# Patient Record
Sex: FEMALE | HISPANIC OR LATINO | URBAN - METROPOLITAN AREA
[De-identification: names, ages, dates, MRNs, and addresses within clinical notes are randomized per-mention and may not be internally consistent; named-entity substitution may affect disease eponyms.]

---

## 2023-02-20 ENCOUNTER — OFFICE (OUTPATIENT)
Dept: URBAN - METROPOLITAN AREA CLINIC 116 | Facility: CLINIC | Age: 58
Setting detail: OPHTHALMOLOGY
End: 2023-02-20
Payer: COMMERCIAL

## 2023-02-20 DIAGNOSIS — H40.033: ICD-10-CM

## 2023-02-20 DIAGNOSIS — H25.13: ICD-10-CM

## 2023-02-20 DIAGNOSIS — H11.041: ICD-10-CM

## 2023-02-20 PROCEDURE — 92004 COMPRE OPH EXAM NEW PT 1/>: CPT | Performed by: OPTOMETRIST

## 2023-02-20 ASSESSMENT — REFRACTION_AUTOREFRACTION
OD_AXIS: 170
OS_SPHERE: +4.75
OS_AXIS: 115
OS_CYLINDER: -0.75
OD_CYLINDER: -1.00
OD_SPHERE: +4.50

## 2023-02-20 ASSESSMENT — SPHEQUIV_DERIVED
OS_SPHEQUIV: 4.375
OD_SPHEQUIV: 3.875
OD_SPHEQUIV: 4
OS_SPHEQUIV: 4.25
OS_SPHEQUIV: 2.5

## 2023-02-20 ASSESSMENT — REFRACTION_MANIFEST
OD_VA2: 20/20
OS_SPHERE: +3.25
OD_SPHERE: +4.25
OS_CYLINDER: SHPERE
OD_VA1: 20/25
OD_AXIS: 170
OD_VA1: 20/25
OS_VA1: 20/25
OD_ADD: +2.25
OS_ADD: +175
OD_VA1: 20/20
OD_SPHERE: +2.50
OD_ADD: +1.75
OS_AXIS: 150
OS_VA2: 20/20
OS_CYLINDER: -0.50
OS_CYLINDER: -0.50
OS_ADD: +2.25
OS_VA1: 20/20
OD_SPHERE: +3.25
OD_CYLINDER: SHPERE
OD_CYLINDER: -0.75
OS_AXIS: 115
OS_SPHERE: +4.50
OS_VA1: 20/25
OS_SPHERE: +2.75

## 2023-02-20 ASSESSMENT — REFRACTION_CURRENTRX
OS_CYLINDER: -0.25
OD_CYLINDER: -0.50
OS_SPHERE: +3.75
OS_VPRISM_DIRECTION: BF
OS_ADD: +1.75
OD_SPHERE: +3.75
OD_OVR_VA: 20/
OD_AXIS: 005
OD_VPRISM_DIRECTION: BF
OS_OVR_VA: 20/
OD_ADD: +1.75
OS_AXIS: 105

## 2023-02-20 ASSESSMENT — KERATOMETRY
OS_AXISANGLE_DEGREES: 170
METHOD_AUTO_MANUAL: AUTO
OD_K2POWER_DIOPTERS: 43.75
OD_AXISANGLE_DEGREES: 085
OS_K2POWER_DIOPTERS: 43.25
OD_K1POWER_DIOPTERS: 42.50
OS_K1POWER_DIOPTERS: 39.50

## 2023-02-20 ASSESSMENT — TONOMETRY
OS_IOP_MMHG: 17
OD_IOP_MMHG: 15

## 2023-02-20 ASSESSMENT — VISUAL ACUITY
OD_BCVA: 20/25
OS_BCVA: 20/25

## 2023-02-20 ASSESSMENT — CONFRONTATIONAL VISUAL FIELD TEST (CVF)
OD_FINDINGS: FULL
OS_FINDINGS: FULL

## 2023-02-20 ASSESSMENT — AXIALLENGTH_DERIVED
OS_AL: 23.4
OD_AL: 22.2953
OS_AL: 22.7479
OS_AL: 22.7027
OD_AL: 22.2519

## 2023-02-20 ASSESSMENT — CORNEAL PTERYGIUM: OD_PTERYGIUM: NASAL 2MM

## 2023-03-10 ENCOUNTER — OFFICE (OUTPATIENT)
Dept: URBAN - METROPOLITAN AREA CLINIC 94 | Facility: CLINIC | Age: 58
Setting detail: OPHTHALMOLOGY
End: 2023-03-10
Payer: COMMERCIAL

## 2023-03-10 DIAGNOSIS — H40.033: ICD-10-CM

## 2023-03-10 DIAGNOSIS — H25.13: ICD-10-CM

## 2023-03-10 PROCEDURE — 92020 GONIOSCOPY: CPT | Performed by: OPHTHALMOLOGY

## 2023-03-10 PROCEDURE — 99214 OFFICE O/P EST MOD 30 MIN: CPT | Performed by: OPHTHALMOLOGY

## 2023-03-10 ASSESSMENT — KERATOMETRY
OS_K1POWER_DIOPTERS: 43.00
OD_AXISANGLE_DEGREES: 089
OS_CYLAXISANGLE_DEGREES: 016
OS_AXISANGLE_DEGREES: 016
OD_AXISANGLE_DEGREES: 179
METHOD_AUTO_MANUAL: AUTO
OD_K2POWER_DIOPTERS: 43.75
OS_AXISANGLE2_DEGREES: 016
OS_K1K2_AVERAGE: 43.25
OD_CYLAXISANGLE_DEGREES: 089
OD_AXISANGLE2_DEGREES: 089
OD_K1K2_AVERAGE: 43.125
OS_K1POWER_DIOPTERS: 43.00
OD_K1POWER_DIOPTERS: 42.50
OD_CYLPOWER_DEGREES: 1.25
OS_CYLPOWER_DEGREES: 0.5
OD_K1POWER_DIOPTERS: 42.50
OS_K2POWER_DIOPTERS: 43.50
OS_K2POWER_DIOPTERS: 43.50
OD_K2POWER_DIOPTERS: 43.75
OS_AXISANGLE_DEGREES: 106

## 2023-03-10 ASSESSMENT — REFRACTION_AUTOREFRACTION
OS_SPHERE: +4.75
OS_CYLINDER: -0.75
OS_AXIS: 111
OD_SPHERE: +4.50
OD_CYLINDER: -0.50
OD_AXIS: 168

## 2023-03-10 ASSESSMENT — REFRACTION_MANIFEST
OS_SPHERE: +4.50
OD_CYLINDER: SHPERE
OD_CYLINDER: -0.75
OS_VA1: 20/25
OD_VA1: 20/25
OD_ADD: +1.75
OS_CYLINDER: SHPERE
OD_SPHERE: +2.50
OD_AXIS: 170
OS_AXIS: 115
OD_VA1: 20/25
OD_ADD: +2.25
OS_ADD: +2.25
OS_CYLINDER: -0.50
OD_SPHERE: +3.25
OS_VA1: 20/25
OD_VA2: 20/20
OD_SPHERE: +4.25
OD_VA1: 20/20
OS_VA2: 20/20
OS_CYLINDER: -0.50
OS_AXIS: 150
OS_SPHERE: +2.75
OS_SPHERE: +3.25
OS_ADD: +175
OS_VA1: 20/20

## 2023-03-10 ASSESSMENT — CONFRONTATIONAL VISUAL FIELD TEST (CVF)
OD_FINDINGS: FULL
OS_FINDINGS: FULL

## 2023-03-10 ASSESSMENT — REFRACTION_CURRENTRX
OD_VPRISM_DIRECTION: BF
OS_OVR_VA: 20/
OD_SPHERE: +3.75
OS_AXIS: 105
OS_SPHERE: +3.75
OS_CYLINDER: -0.25
OD_CYLINDER: -0.50
OD_OVR_VA: 20/
OD_AXIS: 005
OD_ADD: +1.75
OS_ADD: +1.75
OS_VPRISM_DIRECTION: BF

## 2023-03-10 ASSESSMENT — AXIALLENGTH_DERIVED
OD_AL: 22.1656
OS_AL: 22.7415
OS_AL: 22.0824
OD_AL: 22.2953
OS_AL: 22.1251

## 2023-03-10 ASSESSMENT — SPHEQUIV_DERIVED
OD_SPHEQUIV: 4.25
OS_SPHEQUIV: 2.5
OS_SPHEQUIV: 4.375
OD_SPHEQUIV: 3.875
OS_SPHEQUIV: 4.25

## 2023-03-10 ASSESSMENT — CORNEAL PTERYGIUM: OD_PTERYGIUM: NASAL 2MM

## 2023-03-10 ASSESSMENT — VISUAL ACUITY
OD_BCVA: 20/25-
OS_BCVA: 20/20-

## 2023-03-10 ASSESSMENT — TONOMETRY
OD_IOP_MMHG: 16
OS_IOP_MMHG: 16

## 2023-03-18 ENCOUNTER — EMERGENCY (EMERGENCY)
Facility: HOSPITAL | Age: 58
LOS: 1 days | Discharge: HOME | End: 2023-03-18
Attending: STUDENT IN AN ORGANIZED HEALTH CARE EDUCATION/TRAINING PROGRAM
Payer: COMMERCIAL

## 2023-03-18 VITALS
TEMPERATURE: 98 F | DIASTOLIC BLOOD PRESSURE: 96 MMHG | SYSTOLIC BLOOD PRESSURE: 181 MMHG | OXYGEN SATURATION: 99 % | RESPIRATION RATE: 20 BRPM | HEART RATE: 89 BPM

## 2023-03-18 DIAGNOSIS — R11.0 NAUSEA: ICD-10-CM

## 2023-03-18 DIAGNOSIS — I10 ESSENTIAL (PRIMARY) HYPERTENSION: ICD-10-CM

## 2023-03-18 DIAGNOSIS — M79.602 PAIN IN LEFT ARM: ICD-10-CM

## 2023-03-18 LAB
ALBUMIN SERPL ELPH-MCNC: 3.9 G/DL — SIGNIFICANT CHANGE UP (ref 3.3–5.2)
ALP SERPL-CCNC: 119 U/L — SIGNIFICANT CHANGE UP (ref 40–120)
ALT FLD-CCNC: 16 U/L — SIGNIFICANT CHANGE UP
ANION GAP SERPL CALC-SCNC: 13 MMOL/L — SIGNIFICANT CHANGE UP (ref 5–17)
AST SERPL-CCNC: 21 U/L — SIGNIFICANT CHANGE UP
BASOPHILS # BLD AUTO: 0.04 K/UL — SIGNIFICANT CHANGE UP (ref 0–0.2)
BASOPHILS NFR BLD AUTO: 0.3 % — SIGNIFICANT CHANGE UP (ref 0–2)
BILIRUB SERPL-MCNC: 0.2 MG/DL — LOW (ref 0.4–2)
BUN SERPL-MCNC: 12.6 MG/DL — SIGNIFICANT CHANGE UP (ref 8–20)
CALCIUM SERPL-MCNC: 8.8 MG/DL — SIGNIFICANT CHANGE UP (ref 8.4–10.5)
CHLORIDE SERPL-SCNC: 101 MMOL/L — SIGNIFICANT CHANGE UP (ref 96–108)
CO2 SERPL-SCNC: 23 MMOL/L — SIGNIFICANT CHANGE UP (ref 22–29)
CREAT SERPL-MCNC: 0.65 MG/DL — SIGNIFICANT CHANGE UP (ref 0.5–1.3)
EGFR: 102 ML/MIN/1.73M2 — SIGNIFICANT CHANGE UP
EOSINOPHIL # BLD AUTO: 0.15 K/UL — SIGNIFICANT CHANGE UP (ref 0–0.5)
EOSINOPHIL NFR BLD AUTO: 1.1 % — SIGNIFICANT CHANGE UP (ref 0–6)
GLUCOSE SERPL-MCNC: 232 MG/DL — HIGH (ref 70–99)
HCT VFR BLD CALC: 41.4 % — SIGNIFICANT CHANGE UP (ref 34.5–45)
HGB BLD-MCNC: 13.7 G/DL — SIGNIFICANT CHANGE UP (ref 11.5–15.5)
IMM GRANULOCYTES NFR BLD AUTO: 0.4 % — SIGNIFICANT CHANGE UP (ref 0–0.9)
LYMPHOCYTES # BLD AUTO: 27 % — SIGNIFICANT CHANGE UP (ref 13–44)
LYMPHOCYTES # BLD AUTO: 3.72 K/UL — HIGH (ref 1–3.3)
MCHC RBC-ENTMCNC: 29.1 PG — SIGNIFICANT CHANGE UP (ref 27–34)
MCHC RBC-ENTMCNC: 33.1 GM/DL — SIGNIFICANT CHANGE UP (ref 32–36)
MCV RBC AUTO: 88.1 FL — SIGNIFICANT CHANGE UP (ref 80–100)
MONOCYTES # BLD AUTO: 0.74 K/UL — SIGNIFICANT CHANGE UP (ref 0–0.9)
MONOCYTES NFR BLD AUTO: 5.4 % — SIGNIFICANT CHANGE UP (ref 2–14)
NEUTROPHILS # BLD AUTO: 9.06 K/UL — HIGH (ref 1.8–7.4)
NEUTROPHILS NFR BLD AUTO: 65.8 % — SIGNIFICANT CHANGE UP (ref 43–77)
PLATELET # BLD AUTO: 222 K/UL — SIGNIFICANT CHANGE UP (ref 150–400)
POTASSIUM SERPL-MCNC: 4.3 MMOL/L — SIGNIFICANT CHANGE UP (ref 3.5–5.3)
POTASSIUM SERPL-SCNC: 4.3 MMOL/L — SIGNIFICANT CHANGE UP (ref 3.5–5.3)
PROT SERPL-MCNC: 7.2 G/DL — SIGNIFICANT CHANGE UP (ref 6.6–8.7)
RBC # BLD: 4.7 M/UL — SIGNIFICANT CHANGE UP (ref 3.8–5.2)
RBC # FLD: 13.7 % — SIGNIFICANT CHANGE UP (ref 10.3–14.5)
SODIUM SERPL-SCNC: 137 MMOL/L — SIGNIFICANT CHANGE UP (ref 135–145)
TROPONIN T SERPL-MCNC: <0.01 NG/ML — SIGNIFICANT CHANGE UP (ref 0–0.06)
WBC # BLD: 13.77 K/UL — HIGH (ref 3.8–10.5)
WBC # FLD AUTO: 13.77 K/UL — HIGH (ref 3.8–10.5)

## 2023-03-18 PROCEDURE — 93010 ELECTROCARDIOGRAM REPORT: CPT

## 2023-03-18 PROCEDURE — 99284 EMERGENCY DEPT VISIT MOD MDM: CPT

## 2023-03-18 PROCEDURE — 96374 THER/PROPH/DIAG INJ IV PUSH: CPT

## 2023-03-18 PROCEDURE — 84484 ASSAY OF TROPONIN QUANT: CPT

## 2023-03-18 PROCEDURE — 99285 EMERGENCY DEPT VISIT HI MDM: CPT

## 2023-03-18 PROCEDURE — 70450 CT HEAD/BRAIN W/O DYE: CPT | Mod: MG

## 2023-03-18 PROCEDURE — 36415 COLL VENOUS BLD VENIPUNCTURE: CPT

## 2023-03-18 PROCEDURE — 93005 ELECTROCARDIOGRAM TRACING: CPT

## 2023-03-18 PROCEDURE — 99285 EMERGENCY DEPT VISIT HI MDM: CPT | Mod: 25

## 2023-03-18 PROCEDURE — T1013: CPT

## 2023-03-18 PROCEDURE — 85025 COMPLETE CBC W/AUTO DIFF WBC: CPT

## 2023-03-18 PROCEDURE — 71045 X-RAY EXAM CHEST 1 VIEW: CPT | Mod: 26

## 2023-03-18 PROCEDURE — 71045 X-RAY EXAM CHEST 1 VIEW: CPT

## 2023-03-18 PROCEDURE — 80053 COMPREHEN METABOLIC PANEL: CPT

## 2023-03-18 PROCEDURE — G1004: CPT

## 2023-03-18 RX ORDER — KETOROLAC TROMETHAMINE 30 MG/ML
30 SYRINGE (ML) INJECTION ONCE
Refills: 0 | Status: DISCONTINUED | OUTPATIENT
Start: 2023-03-18 | End: 2023-03-18

## 2023-03-18 RX ADMIN — Medication 30 MILLIGRAM(S): at 22:45

## 2023-03-18 RX ADMIN — Medication 30 MILLIGRAM(S): at 23:41

## 2023-03-18 NOTE — ED ADULT NURSE NOTE - CHIEF COMPLAINT
I have reviewed the surgical (or preoperative) H&P that is linked to this encounter, and examined the patient. There are no significant changes    Clinical Conditions Present on Arrival:  Clinically Significant Risk Factors Present on Admission                   
The patient is a 57y Female complaining of hypertension.

## 2023-03-18 NOTE — ED PROVIDER NOTE - NS ED ROS FT
No fever/chills   No photophobia/eye pain/changes in visio,   No ear pain/sore throat/dysphagia   No chest pain/palpitations  No SOB/cough/wheeze/stridor   No abdominal pain, +nausea. no vomiting/diarrhea  No dysuria/frequency/discharge   No neck/back pain,   No rash  No changes in neurological status/function.

## 2023-03-18 NOTE — ED ADULT NURSE NOTE - OBJECTIVE STATEMENT
pt presents to ed with htn/ L arm pain.  pt denies any associating symptoms.  NAD at this time.  pt awaiting CT scan.

## 2023-03-18 NOTE — ED PROVIDER NOTE - CLINICAL SUMMARY MEDICAL DECISION MAKING FREE TEXT BOX
labs and imaging pending. EKG NSR @ 80 bpm no ST-T changes.  Pt with L arm pain and htn following an episode of nausea yesterday. pain constant since yesterday.  neuro intact.  pt signed out to dr. yeung if labs and ct neg plan to d/c with f/up to pcp and return instructions.

## 2023-03-18 NOTE — ED PROVIDER NOTE - OBJECTIVE STATEMENT
Pt is a 58 yo F here for L arm pain and hypertension.  No pertinent PMHx. Pt states that yesterday afternoon she had an episode of nausea and she checked her bp and it was very low. Pt states that her bp jumped up to the 190s shortly after this and has stayed there since then. pt states that she no longer has any nausea but does have L arm pain. no numbness/weakness. no cp. no sob. no n/v. no fever/chills. no other complaint.s

## 2023-03-18 NOTE — ED PROVIDER NOTE - PHYSICAL EXAMINATION
Constitutional - well-developed.   Head - NCAT. Airway patent.   Eyes - PERRL.   CV - RRR. no murmur. no edema.   Pulm - CTAB.   Abd - soft, nt. no rebound. no guarding.   Neuro - A&Ox3. strength 5/5 x4. sensation intact x4. normal gait. CN II-XII intact.  Skin - No rash. .  MSK - normal ROM.

## 2023-03-19 VITALS
RESPIRATION RATE: 16 BRPM | HEART RATE: 73 BPM | DIASTOLIC BLOOD PRESSURE: 76 MMHG | SYSTOLIC BLOOD PRESSURE: 116 MMHG | OXYGEN SATURATION: 99 %

## 2023-03-19 PROCEDURE — G1004: CPT

## 2023-03-19 PROCEDURE — 70450 CT HEAD/BRAIN W/O DYE: CPT | Mod: 26,MG

## 2023-04-05 ENCOUNTER — ASC (OUTPATIENT)
Dept: URBAN - METROPOLITAN AREA SURGERY 8 | Facility: SURGERY | Age: 58
Setting detail: OPHTHALMOLOGY
End: 2023-04-05
Payer: COMMERCIAL

## 2023-04-05 DIAGNOSIS — H40.031: ICD-10-CM

## 2023-04-05 PROCEDURE — 66761 REVISION OF IRIS: CPT | Performed by: OPHTHALMOLOGY

## 2023-04-05 ASSESSMENT — REFRACTION_AUTOREFRACTION
OS_SPHERE: +4.75
OD_SPHERE: +4.50
OD_AXIS: 168
OS_AXIS: 111
OD_CYLINDER: -0.50
OS_CYLINDER: -0.75

## 2023-04-05 ASSESSMENT — REFRACTION_MANIFEST
OD_ADD: +2.25
OD_VA2: 20/20
OS_ADD: +175
OS_VA2: 20/20
OS_VA1: 20/20
OD_CYLINDER: SHPERE
OS_VA1: 20/25
OS_CYLINDER: SHPERE
OD_VA1: 20/25
OD_VA1: 20/25
OS_SPHERE: +2.75
OS_CYLINDER: -0.50
OS_VA1: 20/25
OD_AXIS: 170
OS_CYLINDER: -0.50
OS_SPHERE: +3.25
OD_CYLINDER: -0.75
OD_VA1: 20/20
OS_SPHERE: +4.50
OS_ADD: +2.25
OD_ADD: +1.75
OS_AXIS: 115
OD_SPHERE: +4.25
OD_SPHERE: +2.50
OD_SPHERE: +3.25
OS_AXIS: 150

## 2023-04-05 ASSESSMENT — KERATOMETRY
OS_AXISANGLE_DEGREES: 016
OS_K1POWER_DIOPTERS: 43.00
OD_AXISANGLE_DEGREES: 089
OD_K1POWER_DIOPTERS: 42.50
OD_K2POWER_DIOPTERS: 43.75
METHOD_AUTO_MANUAL: AUTO
OS_K2POWER_DIOPTERS: 43.50

## 2023-04-05 ASSESSMENT — REFRACTION_CURRENTRX
OS_SPHERE: +3.75
OD_AXIS: 005
OS_AXIS: 105
OS_VPRISM_DIRECTION: BF
OS_CYLINDER: -0.25
OD_SPHERE: +3.75
OD_ADD: +1.75
OS_OVR_VA: 20/
OD_VPRISM_DIRECTION: BF
OS_ADD: +1.75
OD_OVR_VA: 20/
OD_CYLINDER: -0.50

## 2023-04-05 ASSESSMENT — SPHEQUIV_DERIVED
OS_SPHEQUIV: 4.25
OD_SPHEQUIV: 4.25
OS_SPHEQUIV: 4.375
OD_SPHEQUIV: 3.875
OS_SPHEQUIV: 2.5

## 2023-04-05 ASSESSMENT — AXIALLENGTH_DERIVED
OS_AL: 22.0824
OS_AL: 22.1251
OD_AL: 22.1656
OD_AL: 22.2953
OS_AL: 22.7415

## 2023-04-05 ASSESSMENT — VISUAL ACUITY
OD_BCVA: 20/25-
OS_BCVA: 20/20-

## 2023-04-07 ENCOUNTER — ASC (OUTPATIENT)
Dept: URBAN - METROPOLITAN AREA SURGERY 8 | Facility: SURGERY | Age: 58
Setting detail: OPHTHALMOLOGY
End: 2023-04-07
Payer: COMMERCIAL

## 2023-04-07 DIAGNOSIS — H40.032: ICD-10-CM

## 2023-04-07 PROBLEM — H11.041 PTERYGIUM; RIGHT EYE: Status: ACTIVE | Noted: 2023-03-10

## 2023-04-07 PROCEDURE — 66761 REVISION OF IRIS: CPT | Performed by: OPHTHALMOLOGY

## 2023-04-07 ASSESSMENT — REFRACTION_MANIFEST
OS_VA1: 20/20
OS_ADD: +175
OD_SPHERE: +4.25
OD_SPHERE: +2.50
OD_ADD: +2.25
OS_ADD: +2.25
OD_AXIS: 170
OS_CYLINDER: SHPERE
OS_VA1: 20/25
OS_VA2: 20/20
OD_ADD: +1.75
OS_AXIS: 150
OD_VA2: 20/20
OS_VA1: 20/25
OD_VA1: 20/25
OD_SPHERE: +3.25
OS_AXIS: 115
OS_SPHERE: +3.25
OD_CYLINDER: -0.75
OS_CYLINDER: -0.50
OD_VA1: 20/20
OS_SPHERE: +2.75
OS_SPHERE: +4.50
OS_CYLINDER: -0.50
OD_CYLINDER: SHPERE
OD_VA1: 20/25

## 2023-04-07 ASSESSMENT — KERATOMETRY
OD_AXISANGLE_DEGREES: 089
OS_AXISANGLE_DEGREES: 016
OS_K1POWER_DIOPTERS: 43.00
OS_K2POWER_DIOPTERS: 43.50
OD_K1POWER_DIOPTERS: 42.50
OD_K2POWER_DIOPTERS: 43.75
METHOD_AUTO_MANUAL: AUTO

## 2023-04-07 ASSESSMENT — REFRACTION_CURRENTRX
OS_VPRISM_DIRECTION: BF
OS_SPHERE: +3.75
OS_CYLINDER: -0.25
OD_CYLINDER: -0.50
OD_VPRISM_DIRECTION: BF
OD_AXIS: 005
OS_AXIS: 105
OS_OVR_VA: 20/
OD_SPHERE: +3.75
OS_ADD: +1.75
OD_ADD: +1.75
OD_OVR_VA: 20/

## 2023-04-07 ASSESSMENT — SPHEQUIV_DERIVED
OS_SPHEQUIV: 4.375
OS_SPHEQUIV: 2.5
OD_SPHEQUIV: 4.25
OD_SPHEQUIV: 3.875
OS_SPHEQUIV: 4.25

## 2023-04-07 ASSESSMENT — REFRACTION_AUTOREFRACTION
OS_AXIS: 111
OS_CYLINDER: -0.75
OS_SPHERE: +4.75
OD_CYLINDER: -0.50
OD_SPHERE: +4.50
OD_AXIS: 168

## 2023-04-07 ASSESSMENT — AXIALLENGTH_DERIVED
OD_AL: 22.1656
OD_AL: 22.2953
OS_AL: 22.7415
OS_AL: 22.1251
OS_AL: 22.0824

## 2023-04-07 ASSESSMENT — VISUAL ACUITY
OS_BCVA: 20/20-
OD_BCVA: 20/25-

## 2023-05-19 ENCOUNTER — OFFICE (OUTPATIENT)
Dept: URBAN - METROPOLITAN AREA CLINIC 94 | Facility: CLINIC | Age: 58
Setting detail: OPHTHALMOLOGY
End: 2023-05-19
Payer: COMMERCIAL

## 2023-05-19 DIAGNOSIS — H25.13: ICD-10-CM

## 2023-05-19 DIAGNOSIS — H25.11: ICD-10-CM

## 2023-05-19 DIAGNOSIS — H40.033: ICD-10-CM

## 2023-05-19 PROBLEM — H25.12 CATARACT SENILE NUCLEAR SCLEROSIS; RIGHT EYE, LEFT EYE, BOTH EYES: Status: ACTIVE | Noted: 2023-05-19

## 2023-05-19 PROCEDURE — 99214 OFFICE O/P EST MOD 30 MIN: CPT | Performed by: OPHTHALMOLOGY

## 2023-05-19 PROCEDURE — 92083 EXTENDED VISUAL FIELD XM: CPT | Performed by: OPHTHALMOLOGY

## 2023-05-19 PROCEDURE — 92020 GONIOSCOPY: CPT | Performed by: OPHTHALMOLOGY

## 2023-05-19 PROCEDURE — 92133 CPTRZD OPH DX IMG PST SGM ON: CPT | Performed by: OPHTHALMOLOGY

## 2023-05-19 PROCEDURE — 92136 OPHTHALMIC BIOMETRY: CPT | Performed by: OPHTHALMOLOGY

## 2023-05-19 ASSESSMENT — KERATOMETRY
OD_AXISANGLE_DEGREES: 090
OS_AXISANGLE_DEGREES: 036
OD_K1POWER_DIOPTERS: 42.75
OS_CYLAXISANGLE_DEGREES: 36
OS_K2POWER_DIOPTERS: 43.50
OD_AXISANGLE_DEGREES: 090
OD_K2POWER_DIOPTERS: 44.00
OS_K1K2_AVERAGE: 43.375
OS_K1POWER_DIOPTERS: 43.25
OD_K2POWER_DIOPTERS: 44.00
OS_CYLPOWER_DEGREES: 0.25
OD_CYLAXISANGLE_DEGREES: 90
OD_AXISANGLE2_DEGREES: 090
OS_K2POWER_DIOPTERS: 43.50
OS_AXISANGLE_DEGREES: 036
OS_K1POWER_DIOPTERS: 43.25
OS_AXISANGLE2_DEGREES: 036
OD_K1K2_AVERAGE: 43.375
OD_K1POWER_DIOPTERS: 42.75
OD_CYLPOWER_DEGREES: 1.25
METHOD_AUTO_MANUAL: AUTO

## 2023-05-19 ASSESSMENT — CORNEAL PTERYGIUM: OD_PTERYGIUM: NASAL 2MM

## 2023-05-19 ASSESSMENT — REFRACTION_MANIFEST
OS_AXIS: 150
OD_VA1: 20/25
OD_VA1: 20/30
OS_CYLINDER: -0.50
OD_SPHERE: +3.25
OS_SPHERE: +3.25
OD_CYLINDER: -0.75
OD_AXIS: 170
OD_ADD: +2.25
OS_SPHERE: +4.50
OS_ADD: +175
OS_CYLINDER: -0.50
OD_VA2: 20/20
OS_VA1: 20/20
OS_VA1: 20/20
OD_VA1: 20/20
OD_CYLINDER: SHPERE
OS_CYLINDER: SHPERE
OS_SPHERE: +2.75
OS_AXIS: 115
OD_ADD: +1.75
OD_SPHERE: +4.25
OS_ADD: +2.25
OS_VA2: 20/20
OS_VA1: 20/25
OD_SPHERE: +2.50

## 2023-05-19 ASSESSMENT — VISUAL ACUITY
OD_BCVA: 20/20-1
OS_BCVA: 20/30+1

## 2023-05-19 ASSESSMENT — TONOMETRY
OD_IOP_MMHG: 12
OS_IOP_MMHG: 12

## 2023-05-19 ASSESSMENT — REFRACTION_CURRENTRX
OS_VPRISM_DIRECTION: BF
OS_ADD: +1.75
OD_OVR_VA: 20/
OD_AXIS: 005
OS_OVR_VA: 20/
OS_CYLINDER: -0.25
OD_VPRISM_DIRECTION: BF
OD_ADD: +1.75
OD_CYLINDER: -0.50
OS_AXIS: 105
OD_SPHERE: +3.75
OS_SPHERE: +3.75

## 2023-05-19 ASSESSMENT — CONFRONTATIONAL VISUAL FIELD TEST (CVF)
OD_FINDINGS: FULL
OS_FINDINGS: FULL

## 2023-05-19 ASSESSMENT — SPHEQUIV_DERIVED
OD_SPHEQUIV: 3.875
OS_SPHEQUIV: 4.25
OS_SPHEQUIV: 2.5
OD_SPHEQUIV: 4.25
OS_SPHEQUIV: 4.625

## 2023-05-19 ASSESSMENT — AXIALLENGTH_DERIVED
OS_AL: 22.0848
OS_AL: 22.6989
OD_AL: 22.2136
OD_AL: 22.0848
OS_AL: 21.9575

## 2023-05-19 ASSESSMENT — REFRACTION_AUTOREFRACTION
OD_CYLINDER: -0.50
OS_CYLINDER: -1.25
OD_AXIS: 174
OD_SPHERE: +4.50
OS_SPHERE: +5.25
OS_AXIS: 113

## 2023-07-07 ENCOUNTER — ASC (OUTPATIENT)
Dept: URBAN - METROPOLITAN AREA SURGERY 8 | Facility: SURGERY | Age: 58
Setting detail: OPHTHALMOLOGY
End: 2023-07-07
Payer: COMMERCIAL

## 2023-07-07 DIAGNOSIS — H25.11: ICD-10-CM

## 2023-07-07 DIAGNOSIS — H52.211: ICD-10-CM

## 2023-07-07 PROCEDURE — 66984 XCAPSL CTRC RMVL W/O ECP: CPT | Performed by: OPHTHALMOLOGY

## 2023-07-07 PROCEDURE — FEMTO FEMTOSECOND LASER: Performed by: OPHTHALMOLOGY

## 2023-07-08 ENCOUNTER — OFFICE (OUTPATIENT)
Dept: URBAN - METROPOLITAN AREA CLINIC 94 | Facility: CLINIC | Age: 58
Setting detail: OPHTHALMOLOGY
End: 2023-07-08
Payer: COMMERCIAL

## 2023-07-08 ENCOUNTER — RX ONLY (RX ONLY)
Age: 58
End: 2023-07-08

## 2023-07-08 DIAGNOSIS — H25.12: ICD-10-CM

## 2023-07-08 DIAGNOSIS — H25.13: ICD-10-CM

## 2023-07-08 DIAGNOSIS — H25.11: ICD-10-CM

## 2023-07-08 DIAGNOSIS — Z96.1: ICD-10-CM

## 2023-07-08 PROCEDURE — 99024 POSTOP FOLLOW-UP VISIT: CPT | Performed by: PHYSICIAN ASSISTANT

## 2023-07-08 ASSESSMENT — REFRACTION_MANIFEST
OS_VA1: 20/20
OS_AXIS: 115
OS_VA1: 20/20
OS_CYLINDER: -0.50
OD_VA1: 20/20
OS_VA1: 20/25
OS_CYLINDER: -0.50
OD_ADD: +1.75
OD_SPHERE: +3.25
OS_AXIS: 150
OS_SPHERE: +2.75
OD_CYLINDER: -0.75
OD_VA1: 20/25
OD_SPHERE: +2.50
OS_ADD: +175
OD_SPHERE: +4.25
OD_VA1: 20/30
OD_ADD: +2.25
OS_ADD: +2.25
OS_SPHERE: +3.25
OD_AXIS: 170
OD_CYLINDER: SHPERE
OS_CYLINDER: SHPERE
OS_VA2: 20/20
OS_SPHERE: +4.50
OD_VA2: 20/20

## 2023-07-08 ASSESSMENT — AXIALLENGTH_DERIVED
OS_AL: 22.0398
OS_AL: 22.7415
OD_AL: 22.052
OS_AL: 22.1251
OD_AL: 24.0472

## 2023-07-08 ASSESSMENT — SPHEQUIV_DERIVED
OS_SPHEQUIV: 4.25
OS_SPHEQUIV: 2.5
OD_SPHEQUIV: -1.5
OS_SPHEQUIV: 4.5
OD_SPHEQUIV: 3.875

## 2023-07-08 ASSESSMENT — KERATOMETRY
OS_AXISANGLE_DEGREES: 040
OD_AXISANGLE_DEGREES: 091
METHOD_AUTO_MANUAL: AUTO
OD_K1POWER_DIOPTERS: 42.75
OS_K1POWER_DIOPTERS: 43.00
OD_K2POWER_DIOPTERS: 45.00
OS_K2POWER_DIOPTERS: 43.50

## 2023-07-08 ASSESSMENT — REFRACTION_CURRENTRX
OD_SPHERE: +3.75
OD_CYLINDER: -0.50
OD_VPRISM_DIRECTION: BF
OD_OVR_VA: 20/
OS_AXIS: 105
OS_CYLINDER: -0.25
OS_OVR_VA: 20/
OS_VPRISM_DIRECTION: BF
OS_ADD: +1.75
OD_ADD: +1.75
OS_SPHERE: +3.75
OD_AXIS: 005

## 2023-07-08 ASSESSMENT — VISUAL ACUITY
OS_BCVA: 20/40+1
OD_BCVA: 20/400

## 2023-07-08 ASSESSMENT — REFRACTION_AUTOREFRACTION
OS_SPHERE: +5.00
OD_SPHERE: -0.75
OD_AXIS: 002
OD_CYLINDER: -1.50
OS_AXIS: 111
OS_CYLINDER: -1.00

## 2023-07-08 ASSESSMENT — CONFRONTATIONAL VISUAL FIELD TEST (CVF)
OD_FINDINGS: FULL
OS_FINDINGS: FULL

## 2023-07-08 ASSESSMENT — TONOMETRY
OS_IOP_MMHG: 16
OD_IOP_MMHG: 14

## 2023-07-08 ASSESSMENT — CORNEAL PTERYGIUM: OD_PTERYGIUM: NASAL 2MM

## 2023-07-14 ENCOUNTER — OFFICE (OUTPATIENT)
Dept: URBAN - METROPOLITAN AREA CLINIC 94 | Facility: CLINIC | Age: 58
Setting detail: OPHTHALMOLOGY
End: 2023-07-14
Payer: COMMERCIAL

## 2023-07-14 DIAGNOSIS — Z96.1: ICD-10-CM

## 2023-07-14 DIAGNOSIS — H25.12: ICD-10-CM

## 2023-07-14 PROCEDURE — 99024 POSTOP FOLLOW-UP VISIT: CPT | Performed by: OPHTHALMOLOGY

## 2023-07-14 PROCEDURE — 92136 OPHTHALMIC BIOMETRY: CPT | Performed by: OPHTHALMOLOGY

## 2023-07-14 ASSESSMENT — REFRACTION_MANIFEST
OS_VA2: 20/20
OD_VA1: 20/25
OS_ADD: +175
OS_AXIS: 115
OS_AXIS: 150
OD_SPHERE: +4.25
OD_SPHERE: +3.25
OD_CYLINDER: SHPERE
OS_ADD: +2.25
OS_SPHERE: +3.25
OS_SPHERE: +2.75
OD_SPHERE: +2.50
OD_ADD: +2.25
OS_VA1: 20/20
OS_VA1: 20/20
OD_VA1: 20/20
OS_CYLINDER: -0.50
OS_CYLINDER: -0.50
OD_VA2: 20/20
OS_CYLINDER: SHPERE
OD_AXIS: 170
OD_CYLINDER: -0.75
OS_SPHERE: +4.50
OD_ADD: +1.75
OS_VA1: 20/25
OD_VA1: 20/30

## 2023-07-14 ASSESSMENT — REFRACTION_AUTOREFRACTION
OS_SPHERE: +5.25
OD_SPHERE: 0.00
OS_AXIS: 108
OD_CYLINDER: -0.50
OD_AXIS: 174
OS_CYLINDER: -1.00

## 2023-07-14 ASSESSMENT — REFRACTION_CURRENTRX
OS_ADD: +1.75
OD_VPRISM_DIRECTION: BF
OD_CYLINDER: -0.50
OS_CYLINDER: -0.25
OD_SPHERE: +3.75
OD_ADD: +1.75
OS_AXIS: 105
OS_OVR_VA: 20/
OS_VPRISM_DIRECTION: BF
OD_OVR_VA: 20/
OD_AXIS: 005
OS_SPHERE: +3.75

## 2023-07-14 ASSESSMENT — SPHEQUIV_DERIVED
OD_SPHEQUIV: 3.875
OS_SPHEQUIV: 2.5
OS_SPHEQUIV: 4.25
OD_SPHEQUIV: -0.25
OS_SPHEQUIV: 4.75

## 2023-07-14 ASSESSMENT — KERATOMETRY
OS_K1POWER_DIOPTERS: 43.00
METHOD_AUTO_MANUAL: AUTO
OS_AXISANGLE_DEGREES: 026
OD_K1POWER_DIOPTERS: 43.00
OD_K2POWER_DIOPTERS: 44.00
OS_K2POWER_DIOPTERS: 43.50
OD_AXISANGLE_DEGREES: 099

## 2023-07-14 ASSESSMENT — AXIALLENGTH_DERIVED
OD_AL: 23.6897
OS_AL: 22.7415
OS_AL: 21.9551
OS_AL: 22.1251
OD_AL: 22.173

## 2023-07-14 ASSESSMENT — CONFRONTATIONAL VISUAL FIELD TEST (CVF)
OD_FINDINGS: FULL
OS_FINDINGS: FULL

## 2023-07-14 ASSESSMENT — TONOMETRY
OS_IOP_MMHG: 16
OD_IOP_MMHG: 16
OS_IOP_MMHG: 20

## 2023-07-14 ASSESSMENT — VISUAL ACUITY
OS_BCVA: 20/30+1
OD_BCVA: 20/350

## 2023-07-14 ASSESSMENT — CORNEAL PTERYGIUM: OD_PTERYGIUM: NASAL 2MM

## 2023-07-21 ENCOUNTER — ASC (OUTPATIENT)
Dept: URBAN - METROPOLITAN AREA SURGERY 8 | Facility: SURGERY | Age: 58
Setting detail: OPHTHALMOLOGY
End: 2023-07-21
Payer: COMMERCIAL

## 2023-07-21 DIAGNOSIS — H25.12: ICD-10-CM

## 2023-07-21 DIAGNOSIS — H52.212: ICD-10-CM

## 2023-07-21 PROCEDURE — FEMTO FEMTOSECOND LASER: Performed by: OPHTHALMOLOGY

## 2023-07-21 PROCEDURE — 66984 XCAPSL CTRC RMVL W/O ECP: CPT | Performed by: OPHTHALMOLOGY

## 2023-07-22 ENCOUNTER — OFFICE (OUTPATIENT)
Dept: URBAN - METROPOLITAN AREA CLINIC 94 | Facility: CLINIC | Age: 58
Setting detail: OPHTHALMOLOGY
End: 2023-07-22
Payer: COMMERCIAL

## 2023-07-22 DIAGNOSIS — Z96.1: ICD-10-CM

## 2023-07-22 DIAGNOSIS — H16.223: ICD-10-CM

## 2023-07-22 PROCEDURE — 99024 POSTOP FOLLOW-UP VISIT: CPT | Performed by: REGISTERED NURSE

## 2023-07-22 ASSESSMENT — REFRACTION_MANIFEST
OS_SPHERE: +2.75
OD_CYLINDER: -0.75
OD_VA1: 20/20
OD_SPHERE: +2.50
OD_SPHERE: +3.25
OS_VA2: 20/20
OD_ADD: +2.25
OS_VA1: 20/20
OS_AXIS: 150
OS_CYLINDER: SHPERE
OS_VA1: 20/20
OD_ADD: +1.75
OD_VA1: 20/30
OS_ADD: +2.25
OD_AXIS: 170
OS_CYLINDER: -0.50
OS_ADD: +175
OS_VA1: 20/25
OD_VA2: 20/20
OS_SPHERE: +3.25
OS_AXIS: 115
OD_SPHERE: +4.25
OS_SPHERE: +4.50
OD_VA1: 20/25
OS_CYLINDER: -0.50
OD_CYLINDER: SHPERE

## 2023-07-22 ASSESSMENT — KERATOMETRY
OD_AXISANGLE_DEGREES: 025
OS_AXISANGLE_DEGREES: 019
OS_K2POWER_DIOPTERS: 43.50
OD_K1POWER_DIOPTERS: 43.00
OD_K2POWER_DIOPTERS: 43.50
OS_K1POWER_DIOPTERS: 43.00
METHOD_AUTO_MANUAL: AUTO

## 2023-07-22 ASSESSMENT — REFRACTION_CURRENTRX
OD_OVR_VA: 20/
OD_SPHERE: +3.75
OS_CYLINDER: -0.25
OS_AXIS: 105
OS_SPHERE: +3.75
OS_VPRISM_DIRECTION: BF
OD_ADD: +1.75
OD_AXIS: 005
OS_ADD: +1.75
OS_OVR_VA: 20/
OD_CYLINDER: -0.50
OD_VPRISM_DIRECTION: BF

## 2023-07-22 ASSESSMENT — AXIALLENGTH_DERIVED
OD_AL: 22.2544
OS_AL: 22.1251
OS_AL: 23.7827
OS_AL: 22.7415
OD_AL: 23.7333

## 2023-07-22 ASSESSMENT — REFRACTION_AUTOREFRACTION
OD_CYLINDER: -0.25
OS_AXIS: 097
OS_CYLINDER: -1.00
OS_SPHERE: +0.25
OD_SPHERE: 0.00
OD_AXIS: 107

## 2023-07-22 ASSESSMENT — VISUAL ACUITY
OS_BCVA: 20/25
OD_BCVA: 20/25

## 2023-07-22 ASSESSMENT — SPHEQUIV_DERIVED
OS_SPHEQUIV: -0.25
OD_SPHEQUIV: 3.875
OS_SPHEQUIV: 2.5
OS_SPHEQUIV: 4.25
OD_SPHEQUIV: -0.125

## 2023-07-22 ASSESSMENT — CONFRONTATIONAL VISUAL FIELD TEST (CVF)
OD_FINDINGS: FULL
OS_FINDINGS: FULL

## 2023-07-22 ASSESSMENT — SUPERFICIAL PUNCTATE KERATITIS (SPK): OD_SPK: 1+

## 2023-07-22 ASSESSMENT — CORNEAL PTERYGIUM: OD_PTERYGIUM: NASAL 2MM

## 2023-07-22 ASSESSMENT — TONOMETRY
OD_IOP_MMHG: 15
OS_IOP_MMHG: 18

## 2023-07-28 ENCOUNTER — OFFICE (OUTPATIENT)
Dept: URBAN - METROPOLITAN AREA CLINIC 94 | Facility: CLINIC | Age: 58
Setting detail: OPHTHALMOLOGY
End: 2023-07-28
Payer: COMMERCIAL

## 2023-07-28 ENCOUNTER — RX ONLY (RX ONLY)
Age: 58
End: 2023-07-28

## 2023-07-28 DIAGNOSIS — Z96.1: ICD-10-CM

## 2023-07-28 DIAGNOSIS — H16.223: ICD-10-CM

## 2023-07-28 PROCEDURE — 99024 POSTOP FOLLOW-UP VISIT: CPT | Performed by: OPHTHALMOLOGY

## 2023-07-28 ASSESSMENT — KERATOMETRY
OS_K2POWER_DIOPTERS: 43.50
OS_K1POWER_DIOPTERS: 43.25
OS_AXISANGLE_DEGREES: 031
METHOD_AUTO_MANUAL: AUTO
OD_K1POWER_DIOPTERS: 43.00
OD_AXISANGLE_DEGREES: 083
OD_K2POWER_DIOPTERS: 43.75

## 2023-07-28 ASSESSMENT — REFRACTION_AUTOREFRACTION
OD_CYLINDER: -0.25
OS_AXIS: 097
OD_AXIS: 135
OS_SPHERE: -0.25
OD_SPHERE: +0.25
OS_CYLINDER: -1.00

## 2023-07-28 ASSESSMENT — SPHEQUIV_DERIVED
OD_SPHEQUIV: 3.875
OS_SPHEQUIV: -0.75
OS_SPHEQUIV: 2.5
OS_SPHEQUIV: 4.25
OD_SPHEQUIV: 0.125

## 2023-07-28 ASSESSMENT — REFRACTION_CURRENTRX
OD_VPRISM_DIRECTION: BF
OS_ADD: +1.75
OD_SPHERE: +3.75
OD_AXIS: 005
OD_OVR_VA: 20/
OS_SPHERE: +3.75
OD_ADD: +1.75
OS_VPRISM_DIRECTION: BF
OS_AXIS: 105
OD_CYLINDER: -0.50
OS_OVR_VA: 20/
OS_CYLINDER: -0.25

## 2023-07-28 ASSESSMENT — REFRACTION_MANIFEST
OD_SPHERE: +4.25
OS_AXIS: 150
OS_ADD: +175
OS_SPHERE: +3.25
OD_ADD: +2.25
OD_VA2: 20/20
OS_CYLINDER: SHPERE
OD_SPHERE: +2.50
OS_VA1: 20/20
OS_VA2: 20/20
OS_CYLINDER: -0.50
OS_CYLINDER: -0.50
OS_SPHERE: +2.75
OD_VA1: 20/25
OS_AXIS: 115
OD_AXIS: 170
OS_VA1: 20/20
OD_VA1: 20/20
OD_SPHERE: +3.25
OD_CYLINDER: -0.75
OS_ADD: +2.25
OD_CYLINDER: SHPERE
OS_VA1: 20/25
OD_VA1: 20/30
OD_ADD: +1.75
OS_SPHERE: +4.50

## 2023-07-28 ASSESSMENT — SUPERFICIAL PUNCTATE KERATITIS (SPK): OD_SPK: 1+

## 2023-07-28 ASSESSMENT — VISUAL ACUITY
OD_BCVA: 20/30-1
OS_BCVA: 20/25+1

## 2023-07-28 ASSESSMENT — TONOMETRY
OS_IOP_MMHG: 15
OD_IOP_MMHG: 15
OD_IOP_MMHG: 20

## 2023-07-28 ASSESSMENT — AXIALLENGTH_DERIVED
OS_AL: 23.9349
OD_AL: 22.2136
OS_AL: 22.0848
OD_AL: 23.5891
OS_AL: 22.6989

## 2023-07-28 ASSESSMENT — CONFRONTATIONAL VISUAL FIELD TEST (CVF)
OS_FINDINGS: FULL
OD_FINDINGS: FULL

## 2023-07-28 ASSESSMENT — CORNEAL PTERYGIUM: OD_PTERYGIUM: NASAL 2MM

## 2023-09-01 ENCOUNTER — OFFICE (OUTPATIENT)
Dept: URBAN - METROPOLITAN AREA CLINIC 94 | Facility: CLINIC | Age: 58
Setting detail: OPHTHALMOLOGY
End: 2023-09-01
Payer: COMMERCIAL

## 2023-09-01 DIAGNOSIS — H40.033: ICD-10-CM

## 2023-09-01 DIAGNOSIS — H16.223: ICD-10-CM

## 2023-09-01 DIAGNOSIS — Z96.1: ICD-10-CM

## 2023-09-01 PROCEDURE — 99024 POSTOP FOLLOW-UP VISIT: CPT | Performed by: OPHTHALMOLOGY

## 2023-09-01 ASSESSMENT — REFRACTION_CURRENTRX
OD_CYLINDER: -0.50
OS_ADD: +1.75
OD_AXIS: 005
OS_SPHERE: +3.75
OD_OVR_VA: 20/
OS_CYLINDER: -0.25
OD_SPHERE: +3.75
OD_ADD: +1.75
OS_OVR_VA: 20/
OS_AXIS: 105
OD_VPRISM_DIRECTION: BF
OS_VPRISM_DIRECTION: BF

## 2023-09-01 ASSESSMENT — REFRACTION_AUTOREFRACTION
OD_CYLINDER: -0.25
OD_SPHERE: 0.00
OS_AXIS: 105
OD_AXIS: 121
OS_SPHERE: +0.25
OS_CYLINDER: -1.00

## 2023-09-01 ASSESSMENT — REFRACTION_MANIFEST
OD_AXIS: 170
OD_ADD: +1.75
OS_VA1: 20/25
OD_SPHERE: +4.25
OS_VA2: 20/20
OS_SPHERE: +3.25
OS_ADD: +175
OD_SPHERE: +2.50
OD_VA1: 20/25
OS_CYLINDER: -0.50
OD_CYLINDER: -0.75
OD_VA1: 20/30
OD_CYLINDER: SHPERE
OD_VA1: 20/20
OS_SPHERE: +4.50
OD_ADD: +2.25
OS_VA1: 20/20
OS_CYLINDER: SHPERE
OS_VA1: 20/20
OS_ADD: +2.25
OS_SPHERE: +2.75
OS_AXIS: 150
OD_SPHERE: +3.25
OS_CYLINDER: -0.50
OD_VA2: 20/20
OS_AXIS: 115

## 2023-09-01 ASSESSMENT — TONOMETRY
OD_IOP_MMHG: 16
OS_IOP_MMHG: 16

## 2023-09-01 ASSESSMENT — KERATOMETRY
OD_K2POWER_DIOPTERS: 43.75
OS_AXISANGLE_DEGREES: 025
METHOD_AUTO_MANUAL: AUTO
OS_K2POWER_DIOPTERS: 43.50
OD_K1POWER_DIOPTERS: 42.75
OS_K1POWER_DIOPTERS: 43.25
OD_AXISANGLE_DEGREES: 082

## 2023-09-01 ASSESSMENT — AXIALLENGTH_DERIVED
OS_AL: 22.0848
OS_AL: 23.7361
OD_AL: 23.7333
OD_AL: 22.2544
OS_AL: 22.6989

## 2023-09-01 ASSESSMENT — SPHEQUIV_DERIVED
OD_SPHEQUIV: -0.125
OD_SPHEQUIV: 3.875
OS_SPHEQUIV: 4.25
OS_SPHEQUIV: -0.25
OS_SPHEQUIV: 2.5

## 2023-09-01 ASSESSMENT — VISUAL ACUITY
OS_BCVA: 20/25-1
OD_BCVA: 20/25-1

## 2023-09-01 ASSESSMENT — SUPERFICIAL PUNCTATE KERATITIS (SPK): OD_SPK: 1+

## 2023-09-01 ASSESSMENT — CONFRONTATIONAL VISUAL FIELD TEST (CVF)
OS_FINDINGS: FULL
OD_FINDINGS: FULL

## 2023-09-01 ASSESSMENT — CORNEAL PTERYGIUM: OD_PTERYGIUM: NASAL 2MM

## 2023-09-22 ENCOUNTER — OFFICE (OUTPATIENT)
Dept: URBAN - METROPOLITAN AREA CLINIC 94 | Facility: CLINIC | Age: 58
Setting detail: OPHTHALMOLOGY
End: 2023-09-22
Payer: COMMERCIAL

## 2023-09-22 DIAGNOSIS — H11.041: ICD-10-CM

## 2023-09-22 DIAGNOSIS — H16.221: ICD-10-CM

## 2023-09-22 PROCEDURE — 99214 OFFICE O/P EST MOD 30 MIN: CPT | Performed by: OPHTHALMOLOGY

## 2023-09-22 PROCEDURE — 83861 MICROFLUID ANALY TEARS: CPT | Performed by: OPHTHALMOLOGY

## 2023-09-22 PROCEDURE — 92285 EXTERNAL OCULAR PHOTOGRAPHY: CPT | Performed by: OPHTHALMOLOGY

## 2023-09-22 ASSESSMENT — REFRACTION_CURRENTRX
OD_VPRISM_DIRECTION: BF
OS_OVR_VA: 20/
OS_CYLINDER: -0.25
OD_OVR_VA: 20/
OS_VPRISM_DIRECTION: BF
OS_SPHERE: +3.75
OD_CYLINDER: -0.50
OD_ADD: +1.75
OS_AXIS: 105
OS_ADD: +1.75
OD_AXIS: 005
OD_SPHERE: +3.75

## 2023-09-22 ASSESSMENT — REFRACTION_MANIFEST
OS_ADD: +175
OD_SPHERE: +4.25
OS_VA2: 20/20
OD_SPHERE: +2.50
OS_ADD: +2.25
OS_SPHERE: +2.75
OS_VA1: 20/20
OS_AXIS: 115
OD_VA1: 20/30
OD_CYLINDER: SHPERE
OD_SPHERE: +3.25
OD_VA1: 20/20
OS_CYLINDER: -0.50
OS_VA1: 20/25
OD_CYLINDER: -0.75
OS_SPHERE: +3.25
OS_CYLINDER: -0.50
OD_VA1: 20/25
OS_VA1: 20/20
OS_CYLINDER: SHPERE
OS_SPHERE: +4.50
OD_VA2: 20/20
OD_ADD: +1.75
OD_ADD: +2.25
OS_AXIS: 150
OD_AXIS: 170

## 2023-09-22 ASSESSMENT — REFRACTION_AUTOREFRACTION
OD_SPHERE: 0.00
OS_CYLINDER: -1.00
OD_AXIS: 121
OS_AXIS: 105
OD_CYLINDER: -0.25
OS_SPHERE: +0.25

## 2023-09-22 ASSESSMENT — CONFRONTATIONAL VISUAL FIELD TEST (CVF)
OS_FINDINGS: FULL
OD_FINDINGS: FULL

## 2023-09-22 ASSESSMENT — KERATOMETRY
OS_K2POWER_DIOPTERS: 43.50
METHOD_AUTO_MANUAL: AUTO
OS_AXISANGLE_DEGREES: 025
OD_K1POWER_DIOPTERS: 42.75
OS_K1POWER_DIOPTERS: 43.25
OD_AXISANGLE_DEGREES: 082
OD_K2POWER_DIOPTERS: 43.75

## 2023-09-22 ASSESSMENT — SPHEQUIV_DERIVED
OS_SPHEQUIV: 4.25
OS_SPHEQUIV: -0.25
OD_SPHEQUIV: 3.875
OS_SPHEQUIV: 2.5
OD_SPHEQUIV: -0.125

## 2023-09-22 ASSESSMENT — AXIALLENGTH_DERIVED
OS_AL: 22.0848
OS_AL: 23.7361
OD_AL: 23.7333
OD_AL: 22.2544
OS_AL: 22.6989

## 2023-09-22 ASSESSMENT — CORNEAL PTERYGIUM: OD_PTERYGIUM: NASAL 3MM

## 2023-09-22 ASSESSMENT — SUPERFICIAL PUNCTATE KERATITIS (SPK): OD_SPK: 1+

## 2023-09-22 ASSESSMENT — VISUAL ACUITY
OS_BCVA: 20/25
OD_BCVA: 20/20-1

## 2023-09-22 ASSESSMENT — TONOMETRY
OD_IOP_MMHG: 14
OS_IOP_MMHG: 17

## 2023-12-06 ENCOUNTER — ASC (OUTPATIENT)
Dept: URBAN - METROPOLITAN AREA SURGERY 8 | Facility: SURGERY | Age: 58
Setting detail: OPHTHALMOLOGY
End: 2023-12-06
Payer: COMMERCIAL

## 2023-12-06 DIAGNOSIS — H11.041: ICD-10-CM

## 2023-12-06 PROCEDURE — 65426 REMOVAL OF EYE LESION: CPT | Mod: RT | Performed by: OPHTHALMOLOGY

## 2023-12-07 ENCOUNTER — OFFICE (OUTPATIENT)
Dept: URBAN - METROPOLITAN AREA CLINIC 112 | Facility: CLINIC | Age: 58
Setting detail: OPHTHALMOLOGY
End: 2023-12-07
Payer: COMMERCIAL

## 2023-12-07 ENCOUNTER — RX ONLY (RX ONLY)
Age: 58
End: 2023-12-07

## 2023-12-07 DIAGNOSIS — H11.041: ICD-10-CM

## 2023-12-07 PROCEDURE — 99024 POSTOP FOLLOW-UP VISIT: CPT | Performed by: PHYSICIAN ASSISTANT

## 2023-12-07 ASSESSMENT — REFRACTION_CURRENTRX
OS_OVR_VA: 20/
OS_CYLINDER: -0.25
OS_SPHERE: +3.75
OD_AXIS: 005
OD_SPHERE: +3.75
OS_ADD: +1.75
OD_OVR_VA: 20/
OD_VPRISM_DIRECTION: BF
OD_ADD: +1.75
OS_VPRISM_DIRECTION: BF
OS_AXIS: 105
OD_CYLINDER: -0.50

## 2023-12-07 ASSESSMENT — REFRACTION_AUTOREFRACTION
OS_CYLINDER: -1.00
OS_AXIS: 105
OD_SPHERE: 0.00
OS_SPHERE: +0.25
OD_CYLINDER: -0.25
OD_AXIS: 121

## 2023-12-07 ASSESSMENT — REFRACTION_MANIFEST
OS_VA1: 20/20
OS_CYLINDER: -0.50
OS_SPHERE: +4.50
OD_CYLINDER: SHPERE
OS_VA1: 20/20
OD_VA2: 20/20
OD_VA1: 20/25
OS_SPHERE: +2.75
OS_CYLINDER: SHPERE
OS_ADD: +175
OS_VA1: 20/25
OD_SPHERE: +3.25
OD_ADD: +2.25
OD_SPHERE: +4.25
OS_AXIS: 115
OS_VA2: 20/20
OS_CYLINDER: -0.50
OD_AXIS: 170
OD_CYLINDER: -0.75
OD_ADD: +1.75
OD_VA1: 20/30
OS_AXIS: 150
OD_VA1: 20/20
OD_SPHERE: +2.50
OS_SPHERE: +3.25
OS_ADD: +2.25

## 2023-12-07 ASSESSMENT — SPHEQUIV_DERIVED
OD_SPHEQUIV: -0.125
OS_SPHEQUIV: 4.25
OS_SPHEQUIV: -0.25
OS_SPHEQUIV: 2.5
OD_SPHEQUIV: 3.875

## 2023-12-07 ASSESSMENT — SUPERFICIAL PUNCTATE KERATITIS (SPK): OD_SPK: 1+

## 2023-12-07 ASSESSMENT — CONFRONTATIONAL VISUAL FIELD TEST (CVF)
OS_FINDINGS: FULL
OD_FINDINGS: FULL

## 2023-12-22 ENCOUNTER — OFFICE (OUTPATIENT)
Dept: URBAN - METROPOLITAN AREA CLINIC 94 | Facility: CLINIC | Age: 58
Setting detail: OPHTHALMOLOGY
End: 2023-12-22
Payer: COMMERCIAL

## 2023-12-22 DIAGNOSIS — H11.041: ICD-10-CM

## 2023-12-22 PROCEDURE — 99024 POSTOP FOLLOW-UP VISIT: CPT | Performed by: OPHTHALMOLOGY

## 2023-12-22 ASSESSMENT — REFRACTION_AUTOREFRACTION
OD_AXIS: 121
OD_SPHERE: 0.00
OS_AXIS: 105
OS_CYLINDER: -1.00
OD_CYLINDER: -0.25
OS_SPHERE: +0.25

## 2023-12-22 ASSESSMENT — SPHEQUIV_DERIVED
OS_SPHEQUIV: 4.25
OD_SPHEQUIV: 3.875
OS_SPHEQUIV: -0.25
OS_SPHEQUIV: 2.5
OD_SPHEQUIV: -0.125

## 2023-12-22 ASSESSMENT — REFRACTION_CURRENTRX
OS_CYLINDER: -0.25
OD_ADD: +1.75
OS_VPRISM_DIRECTION: BF
OD_VPRISM_DIRECTION: BF
OS_AXIS: 105
OD_CYLINDER: -0.50
OD_SPHERE: +3.75
OS_SPHERE: +3.75
OD_AXIS: 005
OD_OVR_VA: 20/
OS_ADD: +1.75
OS_OVR_VA: 20/

## 2023-12-22 ASSESSMENT — REFRACTION_MANIFEST
OS_VA1: 20/20
OS_VA1: 20/20
OD_CYLINDER: -0.75
OD_VA2: 20/20
OS_SPHERE: +2.75
OD_SPHERE: +4.25
OS_CYLINDER: -0.50
OS_AXIS: 115
OD_AXIS: 170
OD_CYLINDER: SHPERE
OS_ADD: +2.25
OD_VA1: 20/30
OS_CYLINDER: SHPERE
OS_SPHERE: +3.25
OD_VA1: 20/25
OS_SPHERE: +4.50
OD_VA1: 20/20
OD_SPHERE: +3.25
OS_CYLINDER: -0.50
OS_VA1: 20/25
OD_ADD: +2.25
OD_ADD: +1.75
OS_ADD: +175
OS_VA2: 20/20
OS_AXIS: 150
OD_SPHERE: +2.50

## 2023-12-22 ASSESSMENT — SUPERFICIAL PUNCTATE KERATITIS (SPK): OD_SPK: 1+

## 2023-12-22 ASSESSMENT — CONFRONTATIONAL VISUAL FIELD TEST (CVF)
OS_FINDINGS: FULL
OD_FINDINGS: FULL

## 2024-01-02 ENCOUNTER — EMERGENCY (EMERGENCY)
Facility: HOSPITAL | Age: 59
LOS: 1 days | Discharge: HOME | End: 2024-01-02
Attending: EMERGENCY MEDICINE
Payer: COMMERCIAL

## 2024-01-02 VITALS
WEIGHT: 160.06 LBS | HEART RATE: 99 BPM | TEMPERATURE: 97 F | OXYGEN SATURATION: 100 % | RESPIRATION RATE: 18 BRPM | SYSTOLIC BLOOD PRESSURE: 190 MMHG | DIASTOLIC BLOOD PRESSURE: 120 MMHG | HEIGHT: 65 IN

## 2024-01-02 VITALS — DIASTOLIC BLOOD PRESSURE: 88 MMHG | SYSTOLIC BLOOD PRESSURE: 154 MMHG

## 2024-01-02 PROCEDURE — 99284 EMERGENCY DEPT VISIT MOD MDM: CPT

## 2024-01-02 PROCEDURE — T1013: CPT

## 2024-01-02 PROCEDURE — 99283 EMERGENCY DEPT VISIT LOW MDM: CPT

## 2024-01-02 RX ORDER — ACETAMINOPHEN 500 MG
650 TABLET ORAL ONCE
Refills: 0 | Status: COMPLETED | OUTPATIENT
Start: 2024-01-02 | End: 2024-01-02

## 2024-01-02 RX ORDER — METHOCARBAMOL 500 MG/1
2 TABLET, FILM COATED ORAL
Qty: 18 | Refills: 0
Start: 2024-01-02 | End: 2024-01-04

## 2024-01-02 RX ORDER — METHOCARBAMOL 500 MG/1
1500 TABLET, FILM COATED ORAL ONCE
Refills: 0 | Status: COMPLETED | OUTPATIENT
Start: 2024-01-02 | End: 2024-01-02

## 2024-01-02 RX ADMIN — Medication 650 MILLIGRAM(S): at 20:46

## 2024-01-02 RX ADMIN — METHOCARBAMOL 1500 MILLIGRAM(S): 500 TABLET, FILM COATED ORAL at 20:46
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
## 2024-01-02 NOTE — ED PROVIDER NOTE - PATIENT PORTAL LINK FT
You can access the FollowMyHealth Patient Portal offered by [facility] by registering at the following website: [email or web address]. By joining [facility]’s FollowMyHealth portal, you will also be able to view your health information using other applications (apps) compatible with our system. You can access the FollowMyHealth Patient Portal offered by [facility] by registering at the following website: [email or web address]. By joining [facility]’s FollowMyHealth portal, you will also be able to view your health information using other applications (apps) compatible with our system.

## 2024-01-02 NOTE — ED ADULT NURSE NOTE - AS PAIN REST
4 (moderate pain) Thalidomide Counseling: I discussed with the patient the risks of thalidomide including but not limited to birth defects, anxiety, weakness, chest pain, dizziness, cough and severe allergy.
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
## 2024-01-02 NOTE — ED ADULT TRIAGE NOTE - PATIENT ON (OXYGEN DELIVERY METHOD)
Last office visit (LOV) for chronic condition 02/16/23  Next office visit (NOV) 07/19/23     room air
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
## 2024-01-02 NOTE — ED ADULT TRIAGE NOTE - MEANS OF ARRIVAL
[Maximal Pain Intensity: 0/10] : 0/10 [Least Pain Intensity: 0/10] : 0/10 [80: Normal activity with effort; some signs or symptoms of disease.] : 80: Normal activity with effort; some signs or symptoms of disease.  [ECOG Performance Status: 1 - Restricted in physically strenuous activity but ambulatory and able to carry out work of a light or sedentary nature] : Performance Status: 1 - Restricted in physically strenuous activity but ambulatory and able to carry out work of a light or sedentary nature, e.g., light house work, office work stretcher

## 2024-01-02 NOTE — ED PROVIDER NOTE - PHYSICAL EXAMINATION
Gen: No acute distress, non toxic  HEENT: Mucous membranes moist, pink conjunctivae, EOMI. PERRL. Airway patent.   CV: RRR, nl s1/s2.  Resp: CTAB, normal rate and effort. No wheezes, rhonchi, or crackles.   GI: Abdomen soft, NT, ND. No rebound, no guarding  Neuro: A&O x4, MAEx4. 5/5 str ext x 4. Sensation intact, symmetric throughout. No FND's. Gait intact. CN 2-12 intact.   MSK: +left lumbar paraspinal TTP. No visualized or palpable deformities.  Skin: No rashes, skin intact and well perfused. Cap refill <2sec  Vascular: Radial and dorsalis pedal pulses 2+ b/l
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
## 2024-01-16 ENCOUNTER — OFFICE (OUTPATIENT)
Dept: URBAN - METROPOLITAN AREA CLINIC 94 | Facility: CLINIC | Age: 59
Setting detail: OPHTHALMOLOGY
End: 2024-01-16
Payer: COMMERCIAL

## 2024-01-16 DIAGNOSIS — H11.041: ICD-10-CM

## 2024-01-16 PROCEDURE — 99024 POSTOP FOLLOW-UP VISIT: CPT | Performed by: OPHTHALMOLOGY

## 2024-01-16 ASSESSMENT — SPHEQUIV_DERIVED
OS_SPHEQUIV: 2.5
OD_SPHEQUIV: -0.375
OS_SPHEQUIV: 4.25
OS_SPHEQUIV: -0.125
OD_SPHEQUIV: 3.875

## 2024-01-16 ASSESSMENT — REFRACTION_CURRENTRX
OD_AXIS: 005
OS_SPHERE: +3.75
OS_CYLINDER: -0.25
OS_ADD: +1.75
OD_ADD: +1.75
OD_CYLINDER: -0.50
OS_VPRISM_DIRECTION: BF
OS_OVR_VA: 20/
OD_SPHERE: +3.75
OD_VPRISM_DIRECTION: BF
OS_AXIS: 105
OD_OVR_VA: 20/

## 2024-01-16 ASSESSMENT — REFRACTION_MANIFEST
OS_AXIS: 150
OS_CYLINDER: -0.50
OD_VA1: 20/25
OS_AXIS: 115
OD_AXIS: 170
OS_VA1: 20/20
OS_SPHERE: +4.50
OD_SPHERE: +3.25
OD_SPHERE: +4.25
OS_SPHERE: +3.25
OS_ADD: +175
OS_VA1: 20/20
OD_SPHERE: +2.50
OS_SPHERE: +2.75
OS_ADD: +2.25
OS_CYLINDER: SHPERE
OD_VA1: 20/30
OS_VA1: 20/25
OS_CYLINDER: -0.50
OD_CYLINDER: -0.75
OD_VA1: 20/20
OS_VA2: 20/20
OD_ADD: +1.75
OD_ADD: +2.25
OD_CYLINDER: SHPERE
OD_VA2: 20/20

## 2024-01-16 ASSESSMENT — REFRACTION_AUTOREFRACTION
OS_AXIS: 099
OS_CYLINDER: -1.25
OS_SPHERE: +0.50
OD_CYLINDER: -0.75
OD_AXIS: 092
OD_SPHERE: 0.00

## 2024-01-16 ASSESSMENT — CONFRONTATIONAL VISUAL FIELD TEST (CVF)
OS_FINDINGS: FULL
OD_FINDINGS: FULL

## 2024-01-16 ASSESSMENT — SUPERFICIAL PUNCTATE KERATITIS (SPK): OD_SPK: 1+

## 2024-02-07 ENCOUNTER — OFFICE (OUTPATIENT)
Dept: URBAN - METROPOLITAN AREA CLINIC 94 | Facility: CLINIC | Age: 59
Setting detail: OPHTHALMOLOGY
End: 2024-02-07
Payer: COMMERCIAL

## 2024-02-07 ENCOUNTER — RX ONLY (RX ONLY)
Age: 59
End: 2024-02-07

## 2024-02-07 DIAGNOSIS — Z96.1: ICD-10-CM

## 2024-02-07 DIAGNOSIS — H40.033: ICD-10-CM

## 2024-02-07 PROCEDURE — 92250 FUNDUS PHOTOGRAPHY W/I&R: CPT | Performed by: OPHTHALMOLOGY

## 2024-02-07 PROCEDURE — 92012 INTRM OPH EXAM EST PATIENT: CPT | Mod: 24 | Performed by: OPHTHALMOLOGY

## 2024-02-07 ASSESSMENT — REFRACTION_MANIFEST
OS_VA1: 20/20
OS_VA2: 20/20
OS_ADD: +2.25
OS_SPHERE: +4.50
OS_CYLINDER: -0.50
OS_VA1: 20/20
OS_CYLINDER: SHPERE
OD_SPHERE: +3.25
OD_VA1: 20/20
OS_ADD: +175
OD_VA1: 20/30
OD_CYLINDER: SHPERE
OD_VA2: 20/20
OD_SPHERE: +4.25
OS_SPHERE: +2.75
OS_AXIS: 150
OS_AXIS: 115
OS_VA1: 20/25
OS_SPHERE: +3.25
OS_CYLINDER: -0.50
OD_ADD: +2.25
OD_AXIS: 170
OD_CYLINDER: -0.75
OD_ADD: +1.75
OD_VA1: 20/25
OD_SPHERE: +2.50

## 2024-02-07 ASSESSMENT — REFRACTION_AUTOREFRACTION
OS_AXIS: 105
OD_SPHERE: 0.00
OS_CYLINDER: -1.00
OD_CYLINDER: -0.75
OD_AXIS: 098
OS_SPHERE: +0.25

## 2024-02-07 ASSESSMENT — SPHEQUIV_DERIVED
OS_SPHEQUIV: 2.5
OS_SPHEQUIV: 4.25
OS_SPHEQUIV: -0.25
OD_SPHEQUIV: -0.375
OD_SPHEQUIV: 3.875

## 2024-02-07 ASSESSMENT — REFRACTION_CURRENTRX
OD_VPRISM_DIRECTION: BF
OD_ADD: +1.75
OS_AXIS: 105
OD_CYLINDER: -0.50
OD_OVR_VA: 20/
OD_AXIS: 005
OS_CYLINDER: -0.25
OS_SPHERE: +3.75
OS_VPRISM_DIRECTION: BF
OD_SPHERE: +3.75
OS_OVR_VA: 20/
OS_ADD: +1.75

## 2024-02-07 ASSESSMENT — CONFRONTATIONAL VISUAL FIELD TEST (CVF)
OD_FINDINGS: FULL
OS_FINDINGS: FULL

## 2024-02-07 ASSESSMENT — SUPERFICIAL PUNCTATE KERATITIS (SPK): OD_SPK: 1+

## 2024-02-23 ENCOUNTER — OFFICE (OUTPATIENT)
Dept: URBAN - METROPOLITAN AREA CLINIC 94 | Facility: CLINIC | Age: 59
Setting detail: OPHTHALMOLOGY
End: 2024-02-23
Payer: COMMERCIAL

## 2024-02-23 DIAGNOSIS — H11.043: ICD-10-CM

## 2024-02-23 DIAGNOSIS — H16.221: ICD-10-CM

## 2024-02-23 PROCEDURE — 99024 POSTOP FOLLOW-UP VISIT: CPT | Performed by: OPHTHALMOLOGY

## 2024-02-23 ASSESSMENT — REFRACTION_MANIFEST
OD_SPHERE: +4.25
OS_VA1: 20/20
OS_AXIS: 115
OD_VA1: 20/30
OS_SPHERE: +3.25
OD_VA1: 20/25
OS_ADD: +2.25
OD_SPHERE: +2.50
OD_CYLINDER: SHPERE
OS_VA1: 20/25
OD_ADD: +1.75
OS_ADD: +175
OD_CYLINDER: -0.75
OD_SPHERE: +3.25
OS_CYLINDER: SHPERE
OD_VA2: 20/20
OS_VA1: 20/20
OD_AXIS: 170
OD_ADD: +2.25
OD_VA1: 20/20
OS_SPHERE: +4.50
OS_AXIS: 150
OS_CYLINDER: -0.50
OS_SPHERE: +2.75
OS_VA2: 20/20
OS_CYLINDER: -0.50

## 2024-02-23 ASSESSMENT — REFRACTION_CURRENTRX
OD_ADD: +1.75
OD_SPHERE: +3.75
OS_ADD: +1.75
OS_VPRISM_DIRECTION: BF
OD_AXIS: 005
OD_VPRISM_DIRECTION: BF
OD_OVR_VA: 20/
OS_AXIS: 105
OD_CYLINDER: -0.50
OS_SPHERE: +3.75
OS_OVR_VA: 20/
OS_CYLINDER: -0.25

## 2024-02-23 ASSESSMENT — REFRACTION_AUTOREFRACTION
OD_AXIS: 098
OD_SPHERE: 0.00
OS_CYLINDER: -1.00
OS_SPHERE: +0.25
OS_AXIS: 105
OD_CYLINDER: -0.75

## 2024-02-23 ASSESSMENT — SPHEQUIV_DERIVED
OS_SPHEQUIV: 2.5
OS_SPHEQUIV: 4.25
OD_SPHEQUIV: -0.375
OD_SPHEQUIV: 3.875
OS_SPHEQUIV: -0.25

## 2024-02-23 ASSESSMENT — SUPERFICIAL PUNCTATE KERATITIS (SPK): OD_SPK: 1+

## 2024-02-23 ASSESSMENT — CONFRONTATIONAL VISUAL FIELD TEST (CVF)
OD_FINDINGS: FULL
OS_FINDINGS: FULL

## 2024-02-28 ENCOUNTER — AMBULATORY SURGERY (OUTPATIENT)
Dept: URBAN - METROPOLITAN AREA SURGERY 2 | Facility: SURGERY | Age: 59
Setting detail: OPHTHALMOLOGY
End: 2024-02-28
Payer: COMMERCIAL

## 2024-02-28 DIAGNOSIS — H26.491: ICD-10-CM

## 2024-02-28 PROCEDURE — 66821 AFTER CATARACT LASER SURGERY: CPT | Mod: RT | Performed by: OPHTHALMOLOGY

## 2024-02-28 ASSESSMENT — REFRACTION_CURRENTRX
OD_CYLINDER: -0.50
OS_ADD: +1.75
OD_AXIS: 005
OS_VPRISM_DIRECTION: BF
OS_OVR_VA: 20/
OD_SPHERE: +3.75
OS_SPHERE: +3.75
OS_CYLINDER: -0.25
OD_ADD: +1.75
OD_OVR_VA: 20/
OD_VPRISM_DIRECTION: BF
OS_AXIS: 105

## 2024-02-28 ASSESSMENT — REFRACTION_MANIFEST
OD_CYLINDER: -0.75
OD_SPHERE: +4.25
OS_VA2: 20/20
OD_CYLINDER: SHPERE
OS_SPHERE: +3.25
OD_SPHERE: +2.50
OS_SPHERE: +2.75
OD_SPHERE: +3.25
OD_AXIS: 170
OS_ADD: +2.25
OS_AXIS: 115
OD_VA1: 20/25
OS_VA1: 20/20
OS_VA1: 20/20
OD_VA2: 20/20
OS_CYLINDER: -0.50
OD_ADD: +1.75
OS_CYLINDER: SHPERE
OD_ADD: +2.25
OD_VA1: 20/30
OS_ADD: +175
OS_SPHERE: +4.50
OS_AXIS: 150
OS_VA1: 20/25
OD_VA1: 20/20
OS_CYLINDER: -0.50

## 2024-02-28 ASSESSMENT — SPHEQUIV_DERIVED
OS_SPHEQUIV: 4.25
OS_SPHEQUIV: 2.5
OD_SPHEQUIV: 3.875
OD_SPHEQUIV: -0.375
OS_SPHEQUIV: -0.25

## 2024-02-28 ASSESSMENT — REFRACTION_AUTOREFRACTION
OD_CYLINDER: -0.75
OS_AXIS: 105
OD_AXIS: 098
OD_SPHERE: 0.00
OS_CYLINDER: -1.00
OS_SPHERE: +0.25

## 2024-03-01 ENCOUNTER — AMBUL SURGICAL CARE (OUTPATIENT)
Dept: URBAN - METROPOLITAN AREA SURGERY 2 | Facility: SURGERY | Age: 59
Setting detail: OPHTHALMOLOGY
End: 2024-03-01
Payer: COMMERCIAL

## 2024-03-01 DIAGNOSIS — H26.492: ICD-10-CM

## 2024-03-01 PROBLEM — H26.493 POSTERIOR CAPSULAR OPACIFICATION; BOTH EYES: Status: ACTIVE | Noted: 2024-02-23

## 2024-03-01 PROBLEM — H26.491 POSTERIOR CAPSULE OPACITY; RIGHT EYE, LEFT EYE, BOTH EYES: Status: ACTIVE | Noted: 2024-02-07

## 2024-03-01 PROBLEM — H26.491 OTHER SECONDARY CATARACT NOT OBSCURING VISION; RIGHT EYE, LEFT EYE: Status: ACTIVE | Noted: 2024-03-01

## 2024-03-01 PROBLEM — H11.043 PTERYGIUM-PERIPHERAL; RIGHT EYE: Status: ACTIVE | Noted: 2024-02-23

## 2024-03-01 PROBLEM — H26.493 POSTERIOR CAPSULE OPACITY; RIGHT EYE, LEFT EYE, BOTH EYES: Status: ACTIVE | Noted: 2024-02-07

## 2024-03-01 PROCEDURE — 66821 AFTER CATARACT LASER SURGERY: CPT | Mod: 79,LT | Performed by: OPHTHALMOLOGY

## 2024-03-01 ASSESSMENT — REFRACTION_CURRENTRX
OS_CYLINDER: -0.25
OD_OVR_VA: 20/
OD_CYLINDER: -0.50
OD_VPRISM_DIRECTION: BF
OD_AXIS: 005
OS_OVR_VA: 20/
OS_AXIS: 105
OS_ADD: +1.75
OS_SPHERE: +3.75
OD_SPHERE: +3.75
OS_VPRISM_DIRECTION: BF
OD_ADD: +1.75

## 2024-03-01 ASSESSMENT — REFRACTION_MANIFEST
OD_ADD: +1.75
OS_SPHERE: +4.50
OS_CYLINDER: -0.50
OS_VA1: 20/20
OD_SPHERE: +2.50
OD_VA1: 20/30
OS_VA1: 20/20
OS_CYLINDER: -0.50
OD_ADD: +2.25
OD_VA2: 20/20
OS_SPHERE: +2.75
OS_VA2: 20/20
OD_VA1: 20/25
OS_SPHERE: +3.25
OS_ADD: +2.25
OS_CYLINDER: SHPERE
OD_VA1: 20/20
OS_VA1: 20/25
OD_SPHERE: +3.25
OS_ADD: +175
OD_SPHERE: +4.25
OS_AXIS: 150
OD_CYLINDER: -0.75
OS_AXIS: 115
OD_CYLINDER: SHPERE
OD_AXIS: 170

## 2024-03-01 ASSESSMENT — SPHEQUIV_DERIVED
OD_SPHEQUIV: 3.875
OS_SPHEQUIV: 2.5
OS_SPHEQUIV: 4.25

## 2024-04-05 ENCOUNTER — OFFICE (OUTPATIENT)
Dept: URBAN - METROPOLITAN AREA CLINIC 94 | Facility: CLINIC | Age: 59
Setting detail: OPHTHALMOLOGY
End: 2024-04-05
Payer: COMMERCIAL

## 2024-04-05 DIAGNOSIS — H26.493: ICD-10-CM

## 2024-04-05 PROCEDURE — 99024 POSTOP FOLLOW-UP VISIT: CPT | Performed by: OPHTHALMOLOGY

## 2024-07-03 ENCOUNTER — APPOINTMENT (OUTPATIENT)
Dept: GASTROENTEROLOGY | Facility: CLINIC | Age: 59
End: 2024-07-03

## 2024-07-03 PROBLEM — Z00.00 ENCOUNTER FOR PREVENTIVE HEALTH EXAMINATION: Status: ACTIVE | Noted: 2024-07-03

## 2024-07-05 ENCOUNTER — RX ONLY (RX ONLY)
Age: 59
End: 2024-07-05

## 2024-07-05 ENCOUNTER — OFFICE (OUTPATIENT)
Dept: URBAN - METROPOLITAN AREA CLINIC 94 | Facility: CLINIC | Age: 59
Setting detail: OPHTHALMOLOGY
End: 2024-07-05
Payer: COMMERCIAL

## 2024-07-05 DIAGNOSIS — H16.221: ICD-10-CM

## 2024-07-05 DIAGNOSIS — H40.033: ICD-10-CM

## 2024-07-05 DIAGNOSIS — Z96.1: ICD-10-CM

## 2024-07-05 PROCEDURE — 99213 OFFICE O/P EST LOW 20 MIN: CPT | Performed by: OPHTHALMOLOGY

## 2024-07-05 PROCEDURE — 92133 CPTRZD OPH DX IMG PST SGM ON: CPT | Performed by: OPHTHALMOLOGY

## 2024-07-05 PROCEDURE — 92020 GONIOSCOPY: CPT | Performed by: OPHTHALMOLOGY

## 2024-07-05 PROCEDURE — 92083 EXTENDED VISUAL FIELD XM: CPT | Performed by: OPHTHALMOLOGY

## 2024-07-05 ASSESSMENT — CONFRONTATIONAL VISUAL FIELD TEST (CVF)
OD_FINDINGS: FULL
OS_FINDINGS: FULL

## 2025-01-24 ENCOUNTER — OFFICE (OUTPATIENT)
Dept: URBAN - METROPOLITAN AREA CLINIC 94 | Facility: CLINIC | Age: 60
Setting detail: OPHTHALMOLOGY
End: 2025-01-24
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
## 2025-01-24 PROCEDURE — NO SHOW FE NO SHOW FEE: Performed by: OPHTHALMOLOGY

## 2025-02-12 ENCOUNTER — OFFICE (OUTPATIENT)
Dept: URBAN - METROPOLITAN AREA CLINIC 115 | Facility: CLINIC | Age: 60
Setting detail: OPHTHALMOLOGY
End: 2025-02-12
Payer: COMMERCIAL

## 2025-02-12 DIAGNOSIS — H40.033: ICD-10-CM

## 2025-02-12 DIAGNOSIS — Z96.1: ICD-10-CM

## 2025-02-12 PROCEDURE — 92014 COMPRE OPH EXAM EST PT 1/>: CPT | Performed by: OPHTHALMOLOGY

## 2025-02-12 PROCEDURE — 92250 FUNDUS PHOTOGRAPHY W/I&R: CPT | Performed by: OPHTHALMOLOGY

## 2025-02-12 ASSESSMENT — REFRACTION_CURRENTRX
OD_SPHERE: +3.75
OS_CYLINDER: -0.25
OD_ADD: +1.75
OS_AXIS: 105
OS_OVR_VA: 20/
OS_VPRISM_DIRECTION: BF
OS_SPHERE: +3.75
OS_ADD: +1.75
OD_CYLINDER: -0.50
OD_VPRISM_DIRECTION: BF
OD_OVR_VA: 20/
OD_AXIS: 005

## 2025-02-12 ASSESSMENT — REFRACTION_MANIFEST
OD_CYLINDER: SHPERE
OD_VA1: 20/20
OD_SPHERE: +2.50
OS_VA1: 20/20
OD_VA1: 20/30
OD_ADD: +2.25
OD_AXIS: 170
OS_VA1: 20/25
OD_SPHERE: +4.25
OS_CYLINDER: -0.50
OS_VA2: 20/20
OD_SPHERE: +3.25
OS_ADD: +2.25
OS_CYLINDER: -0.50
OS_AXIS: 115
OD_ADD: +1.75
OD_VA1: 20/25
OS_CYLINDER: SHPERE
OS_SPHERE: +3.25
OS_AXIS: 150
OS_VA1: 20/20
OD_CYLINDER: -0.75
OS_SPHERE: +2.75
OD_VA2: 20/20
OS_ADD: +175
OS_SPHERE: +4.50

## 2025-02-12 ASSESSMENT — KERATOMETRY
OS_K1POWER_DIOPTERS: 42.75
OD_AXISANGLE_DEGREES: 090
OS_AXISANGLE_DEGREES: 008
METHOD_AUTO_MANUAL: AUTO
OS_K2POWER_DIOPTERS: 43.50
OD_K1POWER_DIOPTERS: 43.50
OD_K2POWER_DIOPTERS: 43.50

## 2025-02-12 ASSESSMENT — REFRACTION_AUTOREFRACTION
OS_CYLINDER: -1.25
OS_SPHERE: +0.25
OD_SPHERE: -0.25
OS_AXIS: 097
OD_CYLINDER: -1.00
OD_AXIS: 077

## 2025-02-12 ASSESSMENT — CONFRONTATIONAL VISUAL FIELD TEST (CVF)
OD_FINDINGS: FULL
OS_FINDINGS: FULL

## 2025-02-12 ASSESSMENT — VISUAL ACUITY
OD_BCVA: 20/20-1
OS_BCVA: 20/20

## 2025-02-12 ASSESSMENT — SUPERFICIAL PUNCTATE KERATITIS (SPK): OD_SPK: 1+

## 2025-02-12 ASSESSMENT — TONOMETRY
OS_IOP_MMHG: 15
OD_IOP_MMHG: 15

## 2025-08-13 ENCOUNTER — OFFICE (OUTPATIENT)
Dept: URBAN - METROPOLITAN AREA CLINIC 115 | Facility: CLINIC | Age: 60
Setting detail: OPHTHALMOLOGY
End: 2025-08-13

## 2025-08-13 DIAGNOSIS — H40.033: ICD-10-CM

## 2025-08-13 DIAGNOSIS — E11.9: ICD-10-CM

## 2025-08-13 DIAGNOSIS — H16.221: ICD-10-CM

## 2025-08-13 PROCEDURE — 92133 CPTRZD OPH DX IMG PST SGM ON: CPT | Performed by: OPHTHALMOLOGY

## 2025-08-13 PROCEDURE — 92012 INTRM OPH EXAM EST PATIENT: CPT | Performed by: OPHTHALMOLOGY

## 2025-08-13 PROCEDURE — 92083 EXTENDED VISUAL FIELD XM: CPT | Performed by: OPHTHALMOLOGY

## 2025-08-13 PROCEDURE — 92020 GONIOSCOPY: CPT | Performed by: OPHTHALMOLOGY

## 2025-08-13 ASSESSMENT — REFRACTION_AUTOREFRACTION
OD_SPHERE: 0.00
OD_CYLINDER: -0.75
OS_SPHERE: 0.00
OD_AXIS: 092
OS_CYLINDER: -1.25
OS_AXIS: 00

## 2025-08-13 ASSESSMENT — REFRACTION_MANIFEST
OS_CYLINDER: SHPERE
OS_CYLINDER: -0.50
OS_VA1: 20/25
OD_SPHERE: +4.25
OS_AXIS: 115
OD_SPHERE: +3.25
OD_AXIS: 170
OD_VA2: 20/20
OS_SPHERE: +2.75
OD_CYLINDER: -0.75
OD_ADD: +2.25
OS_AXIS: 150
OS_VA1: 20/20
OS_VA2: 20/20
OS_ADD: +2.25
OS_VA1: 20/20
OD_SPHERE: +2.50
OS_SPHERE: +3.25
OD_VA1: 20/20
OS_SPHERE: +4.50
OD_CYLINDER: SHPERE
OS_ADD: +175
OD_ADD: +1.75
OS_CYLINDER: -0.50
OD_VA1: 20/30
OD_VA1: 20/25

## 2025-08-13 ASSESSMENT — VISUAL ACUITY
OS_BCVA: 20/25-1
OD_BCVA: 20/25

## 2025-08-13 ASSESSMENT — KERATOMETRY
OS_K2POWER_DIOPTERS: 43.50
OD_K2POWER_DIOPTERS: 43.50
OS_AXISANGLE_DEGREES: 008
OD_K1POWER_DIOPTERS: 43.50
OS_K1POWER_DIOPTERS: 42.75
METHOD_AUTO_MANUAL: AUTO
OD_AXISANGLE_DEGREES: 090

## 2025-08-13 ASSESSMENT — REFRACTION_CURRENTRX
OD_AXIS: 005
OS_CYLINDER: -0.25
OS_ADD: +1.75
OS_VPRISM_DIRECTION: BF
OD_CYLINDER: -0.50
OD_OVR_VA: 20/
OD_VPRISM_DIRECTION: BF
OD_SPHERE: +3.75
OS_AXIS: 105
OS_SPHERE: +3.75
OD_ADD: +1.75
OS_OVR_VA: 20/

## 2025-08-13 ASSESSMENT — CONFRONTATIONAL VISUAL FIELD TEST (CVF)
OS_FINDINGS: FULL
OD_FINDINGS: FULL

## 2025-08-13 ASSESSMENT — SUPERFICIAL PUNCTATE KERATITIS (SPK): OD_SPK: 1+

## 2025-08-13 ASSESSMENT — TONOMETRY
OS_IOP_MMHG: 13
OD_IOP_MMHG: 13